# Patient Record
Sex: FEMALE | Race: WHITE | NOT HISPANIC OR LATINO | Employment: FULL TIME | URBAN - METROPOLITAN AREA
[De-identification: names, ages, dates, MRNs, and addresses within clinical notes are randomized per-mention and may not be internally consistent; named-entity substitution may affect disease eponyms.]

---

## 2017-12-04 ENCOUNTER — INITIAL PRENATAL (OUTPATIENT)
Dept: OBSTETRICS AND GYNECOLOGY | Age: 37
End: 2017-12-04

## 2017-12-04 ENCOUNTER — PROCEDURE VISIT (OUTPATIENT)
Dept: OBSTETRICS AND GYNECOLOGY | Age: 37
End: 2017-12-04

## 2017-12-04 VITALS
BODY MASS INDEX: 20.83 KG/M2 | SYSTOLIC BLOOD PRESSURE: 112 MMHG | HEIGHT: 65 IN | WEIGHT: 125 LBS | DIASTOLIC BLOOD PRESSURE: 60 MMHG

## 2017-12-04 DIAGNOSIS — O09.521 ELDERLY MULTIGRAVIDA IN FIRST TRIMESTER: ICD-10-CM

## 2017-12-04 DIAGNOSIS — Z3A.10 10 WEEKS GESTATION OF PREGNANCY: Primary | ICD-10-CM

## 2017-12-04 DIAGNOSIS — Z3A.10 10 WEEKS GESTATION OF PREGNANCY: ICD-10-CM

## 2017-12-04 DIAGNOSIS — O09.521 ELDERLY MULTIGRAVIDA IN FIRST TRIMESTER: Primary | ICD-10-CM

## 2017-12-04 DIAGNOSIS — Z12.4 ROUTINE CERVICAL SMEAR: ICD-10-CM

## 2017-12-04 DIAGNOSIS — Z11.51 SCREENING FOR HUMAN PAPILLOMAVIRUS: ICD-10-CM

## 2017-12-04 DIAGNOSIS — Z11.3 SCREEN FOR STD (SEXUALLY TRANSMITTED DISEASE): ICD-10-CM

## 2017-12-04 PROBLEM — O09.529 AMA (ADVANCED MATERNAL AGE) MULTIGRAVIDA 35+: Status: ACTIVE | Noted: 2017-12-04

## 2017-12-04 LAB — VZV IGG SER QL: NORMAL

## 2017-12-04 PROCEDURE — 0501F PRENATAL FLOW SHEET: CPT | Performed by: OBSTETRICS & GYNECOLOGY

## 2017-12-04 PROCEDURE — 76817 TRANSVAGINAL US OBSTETRIC: CPT | Performed by: OBSTETRICS & GYNECOLOGY

## 2017-12-04 RX ORDER — LEVOTHYROXINE SODIUM 88 UG/1
88 TABLET ORAL DAILY
COMMUNITY
End: 2018-02-05 | Stop reason: DRUGHIGH

## 2017-12-04 RX ORDER — DOCONEXENT, NIACINAMIDE, .ALPHA.-TOCOPHEROL ACETATE, DL-, CHOLECALCIFEROL, .BETA.-CAROTENE, ASCORBIC ACID, THIAMINE MONONITRATE, RIBOFLAVIN, PYRIDOXINE HYDROCHLORIDE, CYANOCOBALAMIN, IRON, ZINC OXIDE, CUPRIC OXIDE, POTASSIUM IODIDE, MAGNESIUM OXIDE, FOLIC ACID, AND LEVOMEFOLATE CALCIUM 200; 15; 20; 1000; 1100; 30; 1.6; 1.8; 2.5; 12; 29; 25; 2; 150; 20; .4; .6 MG/1; MG/1; [IU]/1; [IU]/1; [IU]/1; MG/1; MG/1; MG/1; MG/1; UG/1; MG/1; MG/1; MG/1; UG/1; MG/1; MG/1; MG/1
200 CAPSULE, LIQUID FILLED ORAL DAILY
Qty: 30 CAPSULE | Refills: 11 | Status: SHIPPED | OUTPATIENT
Start: 2017-12-04 | End: 2017-12-04

## 2017-12-04 RX ORDER — FLUTICASONE PROPIONATE 50 MCG
2 SPRAY, SUSPENSION (ML) NASAL DAILY
COMMUNITY

## 2017-12-04 RX ORDER — MONTELUKAST SODIUM 10 MG/1
10 TABLET ORAL NIGHTLY
COMMUNITY

## 2017-12-04 NOTE — PROGRESS NOTES
"Janusz Green is being seen today for her first obstetrical visit.  The patient is a new patient to me.  She previously lived in [place] and delivered her first child a year.  Her first pregnancy was a term vaginal delivery was complicated by pups rash at term.  Patient had membrane stripping and delivered the next day.  She does have a history of hypothyroidism.  This is a planned pregnancy. She is at 10w6d gestation. Her obstetrical history is significant for advanced maternal age. Relationship with FOB: spouse, living together. Patient does intend to breast feed. Pregnancy history fully reviewed.    Menstrual History:  OB History      Para Term  AB Living    2 1 1   1    SAB TAB Ectopic Multiple Live Births        1        Obstetric Comments    PUPPS - membranes stripped - labor the next day          Menarche age:   Patient's last menstrual period was 2017.       The following portions of the patient's history were reviewed and updated as appropriate: allergies, current medications, past family history, past medical history, past social history, past surgical history and problem list.    Review of Systems  Pertinent items are noted in HPI.      Objective     /60  Ht 65\" (165.1 cm)  Wt 125 lb (56.7 kg)  LMP 2017  BMI 20.8 kg/m2  General appearance: alert, appears stated age and cooperative  Neck: no adenopathy, no carotid bruit, supple, symmetrical, trachea midline and thyroid not enlarged, symmetric, no tenderness/mass/nodules  Lungs: clear to auscultation bilaterally  Breasts: normal appearance, no masses or tenderness  Heart: regular rate and rhythm  Abdomen: soft, non-tender; bowel sounds normal; no masses,  no organomegaly   Pelvic : The uterus is anteverted and approximately 10 weeks size.  No pelvic masses are palpated.  Cervix appears normal in appearance Paps or was collected.  Extremities: extremities normal, atraumatic, no cyanosis or edema  Skin: Skin " color, texture, turgor normal. No rashes or lesions      Assessment     Pregnancy at 10 and 3/7 weeks      Plan     Initial labs drawn.  TSH  and first trimester testing also drawn.  Prenatal vitamins.  New OB information was reviewed in detail.  Patient desires first trimester testing.  She believes she has had carrier testing in her previous pregnancy in [place].  She will try to bring any records she has in.   Problem list reviewed and updated.  AFP3 discussed: declined.  Role of ultrasound in pregnancy discussed; fetal survey: requested.  Amniocentesis discussed: declined.  Follow up in 4 weeks.  .

## 2017-12-06 LAB
BACTERIA UR CULT: NO GROWTH
BACTERIA UR CULT: NORMAL

## 2017-12-08 LAB
C TRACH RRNA CVX QL NAA+PROBE: NEGATIVE
CYTOLOGIST CVX/VAG CYTO: NORMAL
CYTOLOGY CVX/VAG DOC THIN PREP: NORMAL
DX ICD CODE: NORMAL
HIV 1 & 2 AB SER-IMP: NORMAL
HPV I/H RISK 4 DNA CVX QL PROBE+SIG AMP: NEGATIVE
N GONORRHOEA RRNA CVX QL NAA+PROBE: NEGATIVE
OTHER STN SPEC: NORMAL
PATH REPORT.FINAL DX SPEC: NORMAL
STAT OF ADQ CVX/VAG CYTO-IMP: NORMAL

## 2017-12-10 LAB
# FETUSES US: 1
ABO GROUP BLD: (no result)
BASOPHILS # BLD AUTO: 0 X10E3/UL (ref 0–0.2)
BASOPHILS NFR BLD AUTO: 0 %
BLD GP AB SCN SERPL QL: NEGATIVE
CFDNA.FET/CFDNA.TOTAL SFR FETUS: 16.1 %
CHR X + Y ANEUP PLAS.CFDNA: NORMAL
CITATION REF LAB TEST: NORMAL
CYTOGENETICS STUDY: NORMAL
EOSINOPHIL # BLD AUTO: 0 X10E3/UL (ref 0–0.4)
EOSINOPHIL NFR BLD AUTO: 1 %
ERYTHROCYTE [DISTWIDTH] IN BLOOD BY AUTOMATED COUNT: 13.8 % (ref 12.3–15.4)
FET 13+18+21+X+Y ANEUP PLAS.CFDNA: NORMAL
FET CHR 13 TS PLAS.CFDNA QL: NORMAL
FET CHR 13 TS PLAS.CFDNA QL: NORMAL
FET CHR 18 TS PLAS.CFDNA QL: NORMAL
FET CHR 18 TS PLAS.CFDNA QL: NORMAL
FET CHR 21 TS PLAS.CFDNA QL: NORMAL
FET CHR 21 TS PLAS.CFDNA QL: NORMAL
FET CHROM X + Y ANEUP CFDNA IMP: NORMAL
GA: 10.9 WEEKS
GENETIC ALGORITHM SENSITIVITY: NORMAL %
HBV SURFACE AG SERPL QL IA: NEGATIVE
HCT VFR BLD AUTO: 38.2 % (ref 34–46.6)
HGB BLD-MCNC: 13 G/DL (ref 11.1–15.9)
HIV 1+2 AB+HIV1 P24 AG SERPL QL IA: NON REACTIVE
IMM GRANULOCYTES # BLD: 0 X10E3/UL (ref 0–0.1)
IMM GRANULOCYTES NFR BLD: 0 %
LAB DIRECTOR NAME PROVIDER: NORMAL
LYMPHOCYTES # BLD AUTO: 2.7 X10E3/UL (ref 0.7–3.1)
LYMPHOCYTES NFR BLD AUTO: 33 %
Lab: NORMAL
MCH RBC QN AUTO: 30.2 PG (ref 26.6–33)
MCHC RBC AUTO-ENTMCNC: 34 G/DL (ref 31.5–35.7)
MCV RBC AUTO: 89 FL (ref 79–97)
MONOCYTES # BLD AUTO: 0.5 X10E3/UL (ref 0.1–0.9)
MONOCYTES NFR BLD AUTO: 6 %
NEUTROPHILS # BLD AUTO: 4.9 X10E3/UL (ref 1.4–7)
NEUTROPHILS NFR BLD AUTO: 60 %
PLATELET # BLD AUTO: 332 X10E3/UL (ref 150–379)
RBC # BLD AUTO: 4.3 X10E6/UL (ref 3.77–5.28)
REASON FOR REFERRAL (NARRATIVE): NORMAL
REF LAB TEST METHOD: NORMAL
RH BLD: POSITIVE
RPR SER QL: NON REACTIVE
RUBV IGG SERPL IA-ACNC: 2.66 INDEX
SERVICE CMNT 02-IMP: NORMAL
SERVICE CMNT-IMP: NORMAL
TSH SERPL DL<=0.005 MIU/L-ACNC: 2.91 UIU/ML (ref 0.45–4.5)
WBC # BLD AUTO: 8.2 X10E3/UL (ref 3.4–10.8)

## 2017-12-11 ENCOUNTER — TELEPHONE (OUTPATIENT)
Dept: OBSTETRICS AND GYNECOLOGY | Age: 37
End: 2017-12-11

## 2017-12-11 NOTE — TELEPHONE ENCOUNTER
----- Message from Celio Rizo MD sent at 12/10/2017  4:53 PM EST -----  Notify normal results - including first trimester testing and blood work. Notify of gender if patient would like to know.

## 2017-12-11 NOTE — TELEPHONE ENCOUNTER
Lm, pap and labs normal. First trimester testing normal, if pt wants to know gender it is a female.

## 2018-01-02 ENCOUNTER — ROUTINE PRENATAL (OUTPATIENT)
Dept: OBSTETRICS AND GYNECOLOGY | Age: 38
End: 2018-01-02

## 2018-01-02 VITALS — SYSTOLIC BLOOD PRESSURE: 118 MMHG | DIASTOLIC BLOOD PRESSURE: 74 MMHG | BODY MASS INDEX: 21.3 KG/M2 | WEIGHT: 128 LBS

## 2018-01-02 DIAGNOSIS — E06.3 HASHIMOTO'S THYROIDITIS: Primary | ICD-10-CM

## 2018-01-02 PROCEDURE — 0502F SUBSEQUENT PRENATAL CARE: CPT | Performed by: OBSTETRICS & GYNECOLOGY

## 2018-01-02 NOTE — PROGRESS NOTES
Chief Complaint   Patient presents with   • Routine Prenatal Visit     HPI- Pt is 37 y.o.  at 15w0d here for prenatal visit.  TSH was 2.9 approximately 1 month ago.  Patient is taking 88 µg of Synthroid.  She recently traveled to [place].  She is feeling well overall.  First trimester testing was normal.  Gender is female.  ROS-     - No vaginal bleeding    GI- No abdominal pain  /74  Wt 58.1 kg (128 lb)  LMP 2017  BMI 21.3 kg/m2  Exam - See flow sheet    Assessment-  Diagnoses and all orders for this visit:    Hashimoto's thyroiditis  -     TSH      Recheck TSH today.  Return for anatomy ultrasound at 20 weeks.

## 2018-01-03 ENCOUNTER — TELEPHONE (OUTPATIENT)
Dept: OBSTETRICS AND GYNECOLOGY | Age: 38
End: 2018-01-03

## 2018-01-03 LAB — TSH SERPL DL<=0.005 MIU/L-ACNC: 4.25 MIU/ML (ref 0.27–4.2)

## 2018-01-03 NOTE — TELEPHONE ENCOUNTER
Pt not'd of labs. Called Synthroid 100 MCG 30 w/ 1 refill taking once daily to pharm on file.Spoke w/ Steven.

## 2018-01-03 NOTE — TELEPHONE ENCOUNTER
----- Message from Celio Rizo MD sent at 1/3/2018  8:49 AM EST -----  Notify TSH is elevated at 4.2.  Patient will need to increase her dose to 100 µg daily and we will recheck in 6 weeks.  Please call the dose into pharmacy

## 2018-02-05 ENCOUNTER — PROCEDURE VISIT (OUTPATIENT)
Dept: OBSTETRICS AND GYNECOLOGY | Age: 38
End: 2018-02-05

## 2018-02-05 ENCOUNTER — ROUTINE PRENATAL (OUTPATIENT)
Dept: OBSTETRICS AND GYNECOLOGY | Age: 38
End: 2018-02-05

## 2018-02-05 VITALS — BODY MASS INDEX: 22.13 KG/M2 | SYSTOLIC BLOOD PRESSURE: 108 MMHG | WEIGHT: 133 LBS | DIASTOLIC BLOOD PRESSURE: 74 MMHG

## 2018-02-05 DIAGNOSIS — Z34.92 SECOND TRIMESTER FETUS: Primary | ICD-10-CM

## 2018-02-05 DIAGNOSIS — O09.522 ELDERLY MULTIGRAVIDA IN SECOND TRIMESTER: ICD-10-CM

## 2018-02-05 DIAGNOSIS — E06.3 HASHIMOTO'S THYROIDITIS: Primary | ICD-10-CM

## 2018-02-05 LAB — TSH SERPL DL<=0.005 MIU/L-ACNC: 2 MIU/ML (ref 0.27–4.2)

## 2018-02-05 PROCEDURE — 0502F SUBSEQUENT PRENATAL CARE: CPT | Performed by: OBSTETRICS & GYNECOLOGY

## 2018-02-05 PROCEDURE — 76805 OB US >/= 14 WKS SNGL FETUS: CPT | Performed by: OBSTETRICS & GYNECOLOGY

## 2018-02-05 RX ORDER — LEVOTHYROXINE SODIUM 0.1 MG/1
TABLET ORAL
Refills: 1 | COMMUNITY
Start: 2018-01-03 | End: 2018-03-08 | Stop reason: SDUPTHER

## 2018-02-05 NOTE — PROGRESS NOTES
Chief Complaint   Patient presents with   • Pregnancy Ultrasound     HPI- Pt is 38 y.o.  at 19w6d here for prenatal visit.  Anatomy u/s today. TSH was elevated at 4 about 1 month ago.  Her Synthroid was increased to 100 µg per day.  Patient had some questions today about OB call.  She was treated by an OB/GYN last pregnancy who saw her for all her visits and*for all the visits at the hospital.  She was not happy that there is a group practice here.  ROS-     - No vaginal bleeding    GI- No abdominal pain  /74  Wt 60.3 kg (133 lb)  LMP 2017  BMI 22.13 kg/m2  Exam - See flow sheet  Anatomy ultrasound shows normal anatomy within the limits of ultrasound.  Size equals dates.  Placenta is anterior with no previa.  Cervical length is normal.  Assessment-  Diagnoses and all orders for this visit:    Hashimoto's thyroiditis  -     TSH    Elderly multigravida in second trimester    Other orders  -     levothyroxine (SYNTHROID, LEVOTHROID) 100 MCG tablet; TK 1 T PO QD      We reviewed the ultrasound.  It looks good today.  Patient was offered an ultrasound with [facility] since she is advanced maternal age at age 38.  Patient declined.  We discussed the call schedule.  I did recommend that she consider induction of labor at 39 weeks due to her age.  She declines induction of labor at this point.  I discussed days that I'm on-call near her due date.  She states she will stay with this office for now.  We also reviewed a supplement that she is taking for reflux.  It contains calcium.  It appears okay.  We also discussed if needed she could move up to Zantac.  She has taken Prilosec in the past.

## 2018-02-06 ENCOUNTER — TELEPHONE (OUTPATIENT)
Dept: OBSTETRICS AND GYNECOLOGY | Age: 38
End: 2018-02-06

## 2018-02-06 NOTE — TELEPHONE ENCOUNTER
----- Message from Celio Rizo MD sent at 2/6/2018  7:07 AM EST -----  notify notify TSH looks much better.  It is now at 2, we will continue with current dose of central

## 2018-03-06 ENCOUNTER — ROUTINE PRENATAL (OUTPATIENT)
Dept: OBSTETRICS AND GYNECOLOGY | Age: 38
End: 2018-03-06

## 2018-03-06 VITALS — BODY MASS INDEX: 22.8 KG/M2 | SYSTOLIC BLOOD PRESSURE: 108 MMHG | DIASTOLIC BLOOD PRESSURE: 72 MMHG | WEIGHT: 137 LBS

## 2018-03-06 DIAGNOSIS — O09.522 ELDERLY MULTIGRAVIDA IN SECOND TRIMESTER: Primary | ICD-10-CM

## 2018-03-06 PROBLEM — K21.9 GERD (GASTROESOPHAGEAL REFLUX DISEASE): Status: ACTIVE | Noted: 2018-03-06

## 2018-03-06 PROCEDURE — 0502F SUBSEQUENT PRENATAL CARE: CPT | Performed by: OBSTETRICS & GYNECOLOGY

## 2018-03-06 RX ORDER — LANSOPRAZOLE 15 MG/1
15 CAPSULE, DELAYED RELEASE ORAL DAILY
COMMUNITY
End: 2018-07-30

## 2018-03-06 NOTE — PROGRESS NOTES
Patient complains of increased problems with reflux.  She has started on Prevacid 15 mg a day and she has seen an improvement.  She is also had a small amount of nasal bleeding.  She is using Flonase and Singulair.  Her recent TSH was improved.    Exam-see flowsheet    Assessment-24 weeks, Advanced  maternal age.  We did discuss recommendation for delivery between 39 and 40 weeks.  Plan-return in 4 weeks for 28 week labs.  Patient will have her  get the pertussis vaccination.

## 2018-03-08 RX ORDER — LEVOTHYROXINE SODIUM 0.1 MG/1
TABLET ORAL
Qty: 30 TABLET | Refills: 0 | Status: SHIPPED | OUTPATIENT
Start: 2018-03-08 | End: 2018-04-02 | Stop reason: SDUPTHER

## 2018-04-02 ENCOUNTER — ROUTINE PRENATAL (OUTPATIENT)
Dept: OBSTETRICS AND GYNECOLOGY | Age: 38
End: 2018-04-02

## 2018-04-02 VITALS — DIASTOLIC BLOOD PRESSURE: 70 MMHG | BODY MASS INDEX: 23.3 KG/M2 | SYSTOLIC BLOOD PRESSURE: 116 MMHG | WEIGHT: 140 LBS

## 2018-04-02 DIAGNOSIS — Z13.1 SCREENING FOR DIABETES MELLITUS: ICD-10-CM

## 2018-04-02 DIAGNOSIS — Z3A.27 27 WEEKS GESTATION OF PREGNANCY: ICD-10-CM

## 2018-04-02 DIAGNOSIS — E06.3 HASHIMOTO'S DISEASE: Primary | ICD-10-CM

## 2018-04-02 LAB
BASOPHILS # BLD AUTO: 0.01 10*3/MM3 (ref 0–0.2)
BASOPHILS NFR BLD AUTO: 0.1 % (ref 0–1.5)
EOSINOPHIL # BLD AUTO: 0.04 10*3/MM3 (ref 0–0.7)
EOSINOPHIL NFR BLD AUTO: 0.5 % (ref 0.3–6.2)
ERYTHROCYTE [DISTWIDTH] IN BLOOD BY AUTOMATED COUNT: 13.5 % (ref 11.7–13)
GLUCOSE 1H P 50 G GLC PO SERPL-MCNC: 89 MG/DL (ref 65–139)
HCT VFR BLD AUTO: 35.4 % (ref 35.6–45.5)
HGB BLD-MCNC: 11.6 G/DL (ref 11.9–15.5)
IMM GRANULOCYTES # BLD: 0.02 10*3/MM3 (ref 0–0.03)
IMM GRANULOCYTES NFR BLD: 0.3 % (ref 0–0.5)
LYMPHOCYTES # BLD AUTO: 1.76 10*3/MM3 (ref 0.9–4.8)
LYMPHOCYTES NFR BLD AUTO: 23.4 % (ref 19.6–45.3)
MCH RBC QN AUTO: 31.2 PG (ref 26.9–32)
MCHC RBC AUTO-ENTMCNC: 32.8 G/DL (ref 32.4–36.3)
MCV RBC AUTO: 95.2 FL (ref 80.5–98.2)
MONOCYTES # BLD AUTO: 0.67 10*3/MM3 (ref 0.2–1.2)
MONOCYTES NFR BLD AUTO: 8.9 % (ref 5–12)
NEUTROPHILS # BLD AUTO: 5.03 10*3/MM3 (ref 1.9–8.1)
NEUTROPHILS NFR BLD AUTO: 66.8 % (ref 42.7–76)
PLATELET # BLD AUTO: 222 10*3/MM3 (ref 140–500)
RBC # BLD AUTO: 3.72 10*6/MM3 (ref 3.9–5.2)
TSH SERPL DL<=0.005 MIU/L-ACNC: 1.15 MIU/ML (ref 0.27–4.2)
WBC # BLD AUTO: 7.53 10*3/MM3 (ref 4.5–10.7)

## 2018-04-02 PROCEDURE — 0502F SUBSEQUENT PRENATAL CARE: CPT | Performed by: PHYSICIAN ASSISTANT

## 2018-04-02 RX ORDER — LEVOTHYROXINE SODIUM 0.1 MG/1
TABLET ORAL
Qty: 30 TABLET | Refills: 0 | Status: SHIPPED | OUTPATIENT
Start: 2018-04-02 | End: 2018-05-07 | Stop reason: SDUPTHER

## 2018-04-02 NOTE — PROGRESS NOTES
Pt is 27 wk 6 day pregnant pt that is here for a routine check up.  She is doing well. She has no c/o. Dealing with heartburn by taking her prevacid and also using a shot of apple cider vinegar.  She also uses an occasional tums. Good FM.  1 hour glucose test today and tsh will be added as well.  A/P-27 wk 6 day pregnant pt that is here for routine ob visit and 1 hour gtt.  Doing well.  Will f/u for routine ob visit in 4 wks.

## 2018-04-19 ENCOUNTER — ROUTINE PRENATAL (OUTPATIENT)
Dept: OBSTETRICS AND GYNECOLOGY | Age: 38
End: 2018-04-19

## 2018-04-19 ENCOUNTER — PROCEDURE VISIT (OUTPATIENT)
Dept: OBSTETRICS AND GYNECOLOGY | Age: 38
End: 2018-04-19

## 2018-04-19 VITALS — WEIGHT: 142 LBS | SYSTOLIC BLOOD PRESSURE: 108 MMHG | BODY MASS INDEX: 23.63 KG/M2 | DIASTOLIC BLOOD PRESSURE: 74 MMHG

## 2018-04-19 DIAGNOSIS — Z03.74 ENCOUNTER FOR SUSPECTED PROBLEM WITH FETAL GROWTH RULED OUT: Primary | ICD-10-CM

## 2018-04-19 DIAGNOSIS — K21.9 GASTROESOPHAGEAL REFLUX DISEASE WITHOUT ESOPHAGITIS: ICD-10-CM

## 2018-04-19 DIAGNOSIS — E06.3 HASHIMOTO'S THYROIDITIS: ICD-10-CM

## 2018-04-19 DIAGNOSIS — M53.3 SI (SACROILIAC) JOINT DYSFUNCTION: ICD-10-CM

## 2018-04-19 DIAGNOSIS — O09.523 ELDERLY MULTIGRAVIDA IN THIRD TRIMESTER: Primary | ICD-10-CM

## 2018-04-19 PROCEDURE — 76816 OB US FOLLOW-UP PER FETUS: CPT | Performed by: OBSTETRICS & GYNECOLOGY

## 2018-04-19 PROCEDURE — 0502F SUBSEQUENT PRENATAL CARE: CPT | Performed by: OBSTETRICS & GYNECOLOGY

## 2018-04-19 RX ORDER — PNV NO.95/FERROUS FUM/FOLIC AC 28MG-0.8MG
TABLET ORAL
COMMUNITY
Start: 2018-04-02 | End: 2018-07-30

## 2018-04-19 NOTE — PROGRESS NOTES
Patient complains of SI pain.  She is moving slowly.  She is working with her chiropractor and is trying stretches.    Ultrasound today shows normal fetal weight.  Baby is 3 lbs. 9 oz. at the 40th percentile.  LENNY is 12.  Baby is head down.  Exam is normal today see flowsheet    Assessment-30 weeks with advanced maternal age pregnancy  Patient will be referred to physical therapy for SI joint pain.  Recheck TSH which was recently normal at her next visit.  Start biophysical profiles at 36 weeks.

## 2018-04-27 ENCOUNTER — HOSPITAL ENCOUNTER (OUTPATIENT)
Dept: PHYSICAL THERAPY | Facility: HOSPITAL | Age: 38
Setting detail: THERAPIES SERIES
Discharge: HOME | End: 2018-04-27
Attending: OBSTETRICS & GYNECOLOGY

## 2018-04-27 DIAGNOSIS — G89.29 CHRONIC LEFT SI JOINT PAIN: ICD-10-CM

## 2018-04-27 DIAGNOSIS — G89.29 CHRONIC RIGHT SI JOINT PAIN: Primary | ICD-10-CM

## 2018-04-27 DIAGNOSIS — M53.3 CHRONIC LEFT SI JOINT PAIN: ICD-10-CM

## 2018-04-27 DIAGNOSIS — M53.3 SI (SACROILIAC) JOINT DYSFUNCTION: ICD-10-CM

## 2018-04-27 DIAGNOSIS — M53.3 CHRONIC RIGHT SI JOINT PAIN: Primary | ICD-10-CM

## 2018-04-27 PROCEDURE — 97110 THERAPEUTIC EXERCISES: CPT

## 2018-04-27 PROCEDURE — 97161 PT EVAL LOW COMPLEX 20 MIN: CPT

## 2018-04-27 NOTE — THERAPY EVALUATION
Outpatient Physical Therapy Ortho Initial Evaluation  [facility]     Patient Name: Windy Green  : 1980  MRN: [id number]  Today's Date: 2018      Visit Date: 2018    Patient Active Problem List   Diagnosis   • Hashimoto's thyroiditis   • AMA (advanced maternal age) multigravida 35+   • GERD (gastroesophageal reflux disease)   • SI (sacroiliac) joint dysfunction        Past Medical History:   Diagnosis Date   • Acid reflux    • Hashimoto's thyroiditis    • Seasonal allergies         Past Surgical History:   Procedure Laterality Date   • PILONIDAL CYSTECTOMY     • WISDOM TOOTH EXTRACTION         Visit Dx:     ICD-10-CM ICD-9-CM   1. Chronic right SI joint pain M53.3 724.6    G89.29 338.29   2. SI (sacroiliac) joint dysfunction M53.3 724.6   3. Chronic left SI joint pain M53.3 724.6    G89.29 338.29             Patient History     Row Name 18 1300             History    Chief Complaint Pain  -LS      Type of Pain Other pain  -LS      Date Current Problem(s) Began 18  -LS      Brief Description of Current Complaint Pt reports returning home from [employer] vacation and experiencing R SI joint pain. Hx of similar episode in 2016 after first baby birth. Pt is 32 weeks pregnant with second child. She reports relief from chiropractic visit and is now wearing SI joint belt. This week increased R sided pain of unknown cause. Greatest difficulty caring for toddler at home.  -LS      Previous treatment for THIS PROBLEM Chiropractor  -LS      Patient/Caregiver Goals Relieve pain;Return to prior level of function;Know what to do to help the symptoms  -LS      Occupation/sports/leisure activities Pt works from home, travels occasionally.  -LS      Patient seeing anyone else for problem(s)? yes  -LS      How has patient tried to help current problem? chiropractor  -LS      History of Previous Related Injuries similar episode post partum first child   -LS      Are you or can you be pregnant Yes   -LS         Pain     Pain Location Buttocks  -LS      Pain Frequency Intermittent  -LS      Pain Description Sharp;Stabbing;Aching;Shooting  -LS      What Performance Factors Make the Current Problem(s) WORSE? sit to stand, lifting child, bathing/assisting child in bathroom  -LS      What Performance Factors Make the Current Problem(s) BETTER? rest  -LS      Pain Comments It was on the L sometimes too. Now I feel it mostly on the R.  -LS      Tolerance Time- Standing 10 minutes  -LS      Tolerance Time- Sitting some pain with transition/upright position  -LS      Tolerance Time- Walking 15 minutes  -LS      Tolerance Time- Lying difficulty getting comfortable.  -LS      Is your sleep disturbed? Yes  -LS      What position do you sleep in? Right sidelying;Left sidelying  -LS      Difficulties at work? Inc pain in work chair at home, some with travel.  -LS      Difficulties with ADL's? Yes, difficulty bending to  from floor, lifting leg to dress.  -LS      Difficulties with recreational activities? Yes, unable to perform.  -LS         Fall Risk Assessment    Any falls in the past year: No  -LS         Services    Prior Rehab/Home Health Experiences No  -LS         Daily Activities    Primary Language English  -LS      Pt Participated in POC and Goals Yes  -LS         Safety    Are you being hurt, hit, or frightened by anyone at home or in your life? No  -LS      Are you being neglected by a caregiver No  -LS        User Key  (r) = Recorded By, (t) = Taken By, (c) = Cosigned By    Initials Name Provider Type    LS Swathi Álvarez PT Physical Therapist                PT Ortho     Row Name 04/27/18 1600       Subjective Comments    Subjective Comments I am just nervous that having the baby will make it worse again. And this time I have a toddler to woody around and .  -LS       Subjective Pain    Able to rate subjective pain? yes  -LS    Pre-Treatment Pain Level 3  -LS    Post-Treatment Pain Level 3  -LS        Posture/Observations    Posture/Observations Comments anterior pelvic tilt, L anterior innominate rotation, R rotation at sacrum  -LS       Quarter Clearing    Quarter Clearing --   BLE WFL  -LS       Lumbar/SI Special Tests    Standing Flexion Test (SI Dysfunction) Positive  -LS    Seated Flexion Test (SI Dysfunction) Positive  -LS    Slump Test (Neural Tension) Positive  -LS       General ROM    GENERAL ROM COMMENTS BLE WFL  -LS       Sensation    Sensation WNL? WNL  -LS       Lower Extremity Flexibility    Hamstrings Bilateral:;Mildly limited  -LS    Hip External Rotators Bilateral:;Mildly limited  -LS    Hip Internal Rotators Bilateral:;WNL  -LS    Gastrocnemius WNL  -LS       Gait/Stairs Assessment/Training    [place] Level (Gait) independent  -LS    Distance in Feet (Gait) 50  -LS    Deviations/Abnormal Patterns (Gait) antalgic  -LS    Comment (Gait/Stairs) wide IRIS, dec R hip flexion  -LS      User Key  (r) = Recorded By, (t) = Taken By, (c) = Cosigned By    Initials Name Provider Type    LS Swathi Álvarez, PT Physical Therapist                      Therapy Education  Education Details: discussed bed mobility, heat/ice use, core/glute activation for stability, body mechanics for household tasks and childcare  Given: HEP, Symptoms/condition management, Mobility training, Posture/body mechanics  Program: New  How Provided: Verbal, Demonstration, Written  Provided to: Patient  Level of Understanding: Teach back education performed, Verbalized, Demonstrated           PT OP Goals     Row Name 04/27/18 1600          PT Short Term Goals    STG Date to Achieve 05/11/18  -LS     STG 1 Pt will demonstrate understanding and compliance with initial HEP.  -LS     STG 1 Progress New  -LS     STG 2 Pt will report reduced symptoms by 50% using appropriate body mechanics and stabilization strategies.  -LS     STG 2 Progress New  -LS        Long Term Goals    LTG Date to Achieve 05/27/18  -LS     LTG 1 Pt will  demonstrate understanding of advanced HEP to allow her continued management of symptoms.  -LS     LTG 1 Progress New  -LS     LTG 2 Pt will report 0/10 pain with ambulation.  -LS     LTG 2 Progress New  -LS     LTG 3 Pt will demonstrate STS with appropriate mechanics without SI joint aggravation.  -LS     LTG 3 Progress New  -LS        Time Calculation    PT Goal Re-Cert Due Date 07/26/18  -LS       User Key  (r) = Recorded By, (t) = Taken By, (c) = Cosigned By    Initials Name Provider Type    LS Swathi Álvarez, PT Physical Therapist                PT Assessment/Plan     Row Name 04/27/18 1646          PT Assessment    Functional Limitations Limitation in home management;Performance in leisure activities;Performance in work activities;Performance in self-care ADL;Limitations in community activities;Impaired gait;Impaired locomotion;Limitations in functional capacity and performance;Performance in sport activities  -LS     Impairments Pain;Range of motion;Joint mobility;Posture;Joint integrity;Gait;Impaired flexibility;Locomotion;Poor body mechanics  -LS     Assessment Comments Pt is 39 yo female 32 weeks pregnant reporting 1 month onset of evolving R SI joint pain with occasional L sided pain and shooting across low back. SHe has hx of similar symptoms in 2016 after having first child. She demonstrates normal ROM of lumbar spine but pain with all planes. BLE ROM and strength WFL and sensation and reflexes are intact and equal B. She demonstrates anterior L ASIS and R rotated sacrum with R PSIS posterior compared to L. She has pain with all transitional movements. Unable to correct alignment with MET today but reduced pain with TA activation and positional distraction. Pt is limited in her ability to care for child, manage household tasks, and dress herself. Pt will benefit from continued skilled PT services in order to correct pelvic alignment and stabilize SI joint.   -LS     Please refer to paper survey for  additional self-reported information Yes  -LS     Rehab Potential Good  -LS     Patient/caregiver participated in establishment of treatment plan and goals Yes  -LS     Patient would benefit from skilled therapy intervention Yes  -LS        PT Plan    PT Frequency 1x/week  -LS     Predicted Duration of Therapy Intervention (OT Eval) 4 weeks   -LS     Planned CPT's? PT EVAL LOW COMPLEXITY: 21814;PT THER PROC EA 15 MIN: 36237;PT MANUAL THERAPY EA 15 MIN: 10238;PT GAIT TRAINING EA 15 MIN: 07605;PT HOT OR COLD PACK TREAT MCARE;PT RE-EVAL: 75259;PT THER ACT EA 15 MIN: 18151;PT NEUROMUSC RE-EDUCATION EA 15 MIN: 51583  -LS     PT Plan Comments check pelvic alignment, review body mechanics, focus on stabilization, management strategies.   -LS       User Key  (r) = Recorded By, (t) = Taken By, (c) = Cosigned By    Initials Name Provider Type    KENYETTA Álvarez PT Physical Therapist                  Exercises     Row Name 04/27/18 1600             Subjective Comments    Subjective Comments I am just nervous that having the baby will make it worse again. And this time I have a toddler to woody around and .  -LS         Subjective Pain    Able to rate subjective pain? yes  -LS      Pre-Treatment Pain Level 3  -LS      Post-Treatment Pain Level 3  -LS         Exercise 1    Exercise Name 1 seated piriformis stretch  -LS      Reps 1 3  -LS      Time 1 20  -LS         Exercise 2    Exercise Name 2 seated HS stretch  -LS      Reps 2 3  -LS      Time 2 20  -LS         Exercise 3    Exercise Name 3 supine hip adduction  -LS      Reps 3 10  -LS      Time 3 5  -LS         Exercise 4    Exercise Name 4 supine PPT  -LS      Reps 4 10  -LS      Time 4 5  -LS        User Key  (r) = Recorded By, (t) = Taken By, (c) = Cosigned By    Initials Name Provider Type    KENYETTA Álvarez PT Physical Therapist                        Outcome Measure Options: Lower Extremity Functional Scale (LEFS)  Lower Extremity Functional Index  Any of  your usual work, housework or school activities: No difficulty (21% disability)  Your usual hobbies, recreational or sporting activities: No difficulty  Getting into or out of the bath: A little bit of difficulty  Walking between rooms: A little bit of difficulty  Putting on your shoes or socks: A little bit of difficulty  Squatting: A little bit of difficulty  Lifting an object, like a bag of groceries from the floor: No difficulty  Performing light activities around your home: A little bit of difficulty  Performing heavy activities around your home: A little bit of difficulty  Getting into or out of a car: A little bit of difficulty  Walking 2 blocks: A little bit of difficulty  Walking a mile: A little bit of difficulty  Going up or down 10 stairs (about 1 flight of stairs): A little bit of difficulty  Standing for 1 hour: A little bit of difficulty  Sitting for 1 hour: A little bit of difficulty  Running on even ground: A little bit of difficulty  Running on uneven ground: Moderate difficulty  Making sharp turns while running fast: No difficulty  Hopping: A little bit of difficulty  Rolling over in bed: A little bit of difficulty  Total: 63      Time Calculation:   Start Time: 1130  Stop Time: 1215  Time Calculation (min): 45 min  Total Timed Code Minutes- PT: 15 minute(s)     Therapy Charges for Today     Code Description Service Date Service Provider Modifiers Qty    52029500912 HC PT EVAL LOW COMPLEXITY 2 4/27/2018 Swathi Álvarez, PT GP 1    57707597016 HC PT THER PROC EA 15 MIN 4/27/2018 Swathi Álvarez, PT GP 1          PT G-Codes  Outcome Measure Options: Lower Extremity Functional Scale (LEFS)         Swathi Álvarez PT  4/27/2018

## 2018-05-08 NOTE — TELEPHONE ENCOUNTER
I reviewed her last TSH level which was around 1, I called it normal at the time, but then when I got the request to do a refill, I  Noted/recalled that you like them to be around 2?  Should I/we change her dose of med prior to refilling it?

## 2018-05-09 RX ORDER — LEVOTHYROXINE SODIUM 0.1 MG/1
TABLET ORAL
Qty: 30 TABLET | Refills: 0 | Status: SHIPPED | OUTPATIENT
Start: 2018-05-09 | End: 2018-07-31 | Stop reason: DRUGHIGH

## 2018-05-10 ENCOUNTER — ROUTINE PRENATAL (OUTPATIENT)
Dept: OBSTETRICS AND GYNECOLOGY | Age: 38
End: 2018-05-10

## 2018-05-10 VITALS — BODY MASS INDEX: 23.96 KG/M2 | SYSTOLIC BLOOD PRESSURE: 118 MMHG | DIASTOLIC BLOOD PRESSURE: 80 MMHG | WEIGHT: 144 LBS

## 2018-05-10 DIAGNOSIS — O09.523 ELDERLY MULTIGRAVIDA IN THIRD TRIMESTER: Primary | ICD-10-CM

## 2018-05-10 DIAGNOSIS — M53.3 SI (SACROILIAC) JOINT DYSFUNCTION: ICD-10-CM

## 2018-05-10 DIAGNOSIS — E06.3 HASHIMOTO'S THYROIDITIS: ICD-10-CM

## 2018-05-10 PROCEDURE — 90715 TDAP VACCINE 7 YRS/> IM: CPT | Performed by: OBSTETRICS & GYNECOLOGY

## 2018-05-10 PROCEDURE — 0502F SUBSEQUENT PRENATAL CARE: CPT | Performed by: OBSTETRICS & GYNECOLOGY

## 2018-05-10 PROCEDURE — 90471 IMMUNIZATION ADMIN: CPT | Performed by: OBSTETRICS & GYNECOLOGY

## 2018-05-10 NOTE — PROGRESS NOTES
Patient did go to physical therapy and is still seeing her chiropractor.  The SI pain is a little bit better.  Baby is moving well.  Exam-see flowsheet.  Normal exam today.  Glucose testing was normal.  T dap will be given today.    Assessment -33 weeks with advanced maternal age pregnancy.  TSH today.  Plan for group B strep testing at the patient's next visit and start biophysical profiles at 36 weeks.

## 2018-05-11 LAB — TSH SERPL DL<=0.005 MIU/L-ACNC: 0.69 MIU/ML (ref 0.27–4.2)

## 2018-05-24 ENCOUNTER — ROUTINE PRENATAL (OUTPATIENT)
Dept: OBSTETRICS AND GYNECOLOGY | Age: 38
End: 2018-05-24

## 2018-05-24 VITALS — WEIGHT: 143 LBS | BODY MASS INDEX: 23.8 KG/M2 | SYSTOLIC BLOOD PRESSURE: 112 MMHG | DIASTOLIC BLOOD PRESSURE: 66 MMHG

## 2018-05-24 DIAGNOSIS — Z36.85 ANTENATAL SCREENING FOR STREPTOCOCCUS B: Primary | ICD-10-CM

## 2018-05-24 DIAGNOSIS — E06.3 HASHIMOTO'S THYROIDITIS: ICD-10-CM

## 2018-05-24 DIAGNOSIS — O09.523 ELDERLY MULTIGRAVIDA IN THIRD TRIMESTER: ICD-10-CM

## 2018-05-24 PROCEDURE — 0502F SUBSEQUENT PRENATAL CARE: CPT | Performed by: OBSTETRICS & GYNECOLOGY

## 2018-05-24 NOTE — PROGRESS NOTES
The patient went to a tour of the hospital.  She liked the hospital very much.  Her TSH did come back on the lower end of the normal range at 0.6.  The patient is alternating the 100 µg dose and the 80 µg dose.  We will need to recheck her TSH in about 2 weeks.  Good fetal movement is noted.  No contractions.    Group B strep swab was done today.  Cervix is closed and thick.  Assessment-35 weeks advanced maternal age pregnancy.  Start weekly BPP's next week.  Repeat TSH in about 2 weeks.

## 2018-05-26 LAB — GP B STREP DNA SPEC QL NAA+PROBE: NEGATIVE

## 2018-05-29 ENCOUNTER — TELEPHONE (OUTPATIENT)
Dept: OBSTETRICS AND GYNECOLOGY | Age: 38
End: 2018-05-29

## 2018-05-29 NOTE — TELEPHONE ENCOUNTER
----- Message from Celio Rizo MD sent at 5/28/2018  4:56 PM EDT -----  Please notify GBS is negative.

## 2018-05-31 ENCOUNTER — ROUTINE PRENATAL (OUTPATIENT)
Dept: OBSTETRICS AND GYNECOLOGY | Age: 38
End: 2018-05-31

## 2018-05-31 ENCOUNTER — PROCEDURE VISIT (OUTPATIENT)
Dept: OBSTETRICS AND GYNECOLOGY | Age: 38
End: 2018-05-31

## 2018-05-31 VITALS — BODY MASS INDEX: 23.96 KG/M2 | DIASTOLIC BLOOD PRESSURE: 68 MMHG | WEIGHT: 144 LBS | SYSTOLIC BLOOD PRESSURE: 108 MMHG

## 2018-05-31 DIAGNOSIS — E06.3 HASHIMOTO'S THYROIDITIS: Primary | ICD-10-CM

## 2018-05-31 DIAGNOSIS — O09.523 ELDERLY MULTIGRAVIDA IN THIRD TRIMESTER: Primary | ICD-10-CM

## 2018-05-31 DIAGNOSIS — O09.523 ELDERLY MULTIGRAVIDA IN THIRD TRIMESTER: ICD-10-CM

## 2018-05-31 LAB — TSH SERPL DL<=0.005 MIU/L-ACNC: 0.82 MIU/ML (ref 0.27–4.2)

## 2018-05-31 PROCEDURE — 0502F SUBSEQUENT PRENATAL CARE: CPT | Performed by: OBSTETRICS & GYNECOLOGY

## 2018-05-31 PROCEDURE — 76819 FETAL BIOPHYS PROFIL W/O NST: CPT | Performed by: OBSTETRICS & GYNECOLOGY

## 2018-05-31 PROCEDURE — 76816 OB US FOLLOW-UP PER FETUS: CPT | Performed by: OBSTETRICS & GYNECOLOGY

## 2018-05-31 NOTE — PROGRESS NOTES
Patient is here today for biophysical profile due to advanced maternal age.  We also rechecking her TSH today.  She is alternating her 100 µg and 80 µg dose.  Patient notes good fetal movement and no contractions.    Cervix is closed today.  Group B strep is negative  Ultrasound shows an estimated fetal weight of 6 lbs. 7 oz. at the 53rd percentile.  LENNY is 13.  Biophysical profile is 8 out of 8.    Assessment-36 weeks with advanced maternal age pregnancy.  Ultrasound is reassuring.  Continue biophysical profiles weekly.  We may consider induction of labor on 2/24..

## 2018-06-08 ENCOUNTER — PROCEDURE VISIT (OUTPATIENT)
Dept: OBSTETRICS AND GYNECOLOGY | Age: 38
End: 2018-06-08

## 2018-06-08 ENCOUNTER — ROUTINE PRENATAL (OUTPATIENT)
Dept: OBSTETRICS AND GYNECOLOGY | Age: 38
End: 2018-06-08

## 2018-06-08 VITALS — SYSTOLIC BLOOD PRESSURE: 110 MMHG | DIASTOLIC BLOOD PRESSURE: 75 MMHG | BODY MASS INDEX: 24.3 KG/M2 | WEIGHT: 146 LBS

## 2018-06-08 DIAGNOSIS — E06.3 HASHIMOTO'S THYROIDITIS: Primary | ICD-10-CM

## 2018-06-08 DIAGNOSIS — O09.523 ELDERLY MULTIGRAVIDA IN THIRD TRIMESTER: Primary | ICD-10-CM

## 2018-06-08 DIAGNOSIS — O09.523 ELDERLY MULTIGRAVIDA IN THIRD TRIMESTER: ICD-10-CM

## 2018-06-08 PROCEDURE — 76819 FETAL BIOPHYS PROFIL W/O NST: CPT | Performed by: OBSTETRICS & GYNECOLOGY

## 2018-06-08 PROCEDURE — 0502F SUBSEQUENT PRENATAL CARE: CPT | Performed by: OBSTETRICS & GYNECOLOGY

## 2018-06-08 NOTE — PROGRESS NOTES
TSH was suppressed at 0.8.  She is taking 80 µg a day now status alternating..  She notes good fetal movement.  No regular contractions yet.  With her previous pregnancy she had her membranes stripped twice and went into labor after the second time.  Her  is working on a paper and likely not be done until the 22nd.  We have discussed possible induction of labor on June 24.  I recommended induction between 39 and 40 weeks due to her age.    I have physical profile is 8 out of 8.  LENNY is 11.  Patient is feeling good fetal movement.  Cervix is 1 - 2cm 70% and -2 station.    Assessment-37 weeks advanced maternal age pregnancy.  Continue weekly BPP's.  We will decide on induction of labor pending neck weeks evaluation.  June 24th Sunday was offered.

## 2018-06-15 ENCOUNTER — ROUTINE PRENATAL (OUTPATIENT)
Dept: OBSTETRICS AND GYNECOLOGY | Age: 38
End: 2018-06-15

## 2018-06-15 ENCOUNTER — PROCEDURE VISIT (OUTPATIENT)
Dept: OBSTETRICS AND GYNECOLOGY | Age: 38
End: 2018-06-15

## 2018-06-15 VITALS — BODY MASS INDEX: 23.96 KG/M2 | DIASTOLIC BLOOD PRESSURE: 74 MMHG | SYSTOLIC BLOOD PRESSURE: 108 MMHG | WEIGHT: 144 LBS

## 2018-06-15 DIAGNOSIS — O09.523 ELDERLY MULTIGRAVIDA IN THIRD TRIMESTER: Primary | ICD-10-CM

## 2018-06-15 PROCEDURE — 76819 FETAL BIOPHYS PROFIL W/O NST: CPT | Performed by: OBSTETRICS & GYNECOLOGY

## 2018-06-15 PROCEDURE — 0502F SUBSEQUENT PRENATAL CARE: CPT | Performed by: OBSTETRICS & GYNECOLOGY

## 2018-06-15 NOTE — PROGRESS NOTES
Patient is feeling well.  The baby feels lower.  She would like to have membrane stripping done today.  She declines induction of labor unless she does not go into labor before the 24th.    Biophysical profile is 8 out of 8.  LENNY is 12.  Baby is vertex.  See flowsheet.  Cervix is 4 cm stretchy posterior 70% and -2.    Assessment- advanced maternal age pregnancy with reassuring biophysical profile.  Patient has had a significant cervical change.  Labor warnings were given.  She will return next week for recheck.  She declines induction of labor unless she does not have the baby by the 24th.  Continue kick counts.

## 2018-06-21 ENCOUNTER — PROCEDURE VISIT (OUTPATIENT)
Dept: OBSTETRICS AND GYNECOLOGY | Age: 38
End: 2018-06-21

## 2018-06-21 ENCOUNTER — ANESTHESIA (OUTPATIENT)
Dept: LABOR AND DELIVERY | Facility: HOSPITAL | Age: 38
End: 2018-06-21

## 2018-06-21 ENCOUNTER — ROUTINE PRENATAL (OUTPATIENT)
Dept: OBSTETRICS AND GYNECOLOGY | Age: 38
End: 2018-06-21

## 2018-06-21 ENCOUNTER — ANESTHESIA EVENT (OUTPATIENT)
Dept: LABOR AND DELIVERY | Facility: HOSPITAL | Age: 38
End: 2018-06-21

## 2018-06-21 ENCOUNTER — HOSPITAL ENCOUNTER (INPATIENT)
Facility: HOSPITAL | Age: 38
LOS: 2 days | Discharge: HOME | End: 2018-06-23
Attending: OBSTETRICS & GYNECOLOGY | Admitting: OBSTETRICS & GYNECOLOGY

## 2018-06-21 VITALS — DIASTOLIC BLOOD PRESSURE: 74 MMHG | BODY MASS INDEX: 23.96 KG/M2 | SYSTOLIC BLOOD PRESSURE: 108 MMHG | WEIGHT: 144 LBS

## 2018-06-21 DIAGNOSIS — E06.3 HASHIMOTO'S THYROIDITIS: Primary | ICD-10-CM

## 2018-06-21 DIAGNOSIS — E06.3 HASHIMOTO'S THYROIDITIS: ICD-10-CM

## 2018-06-21 DIAGNOSIS — O09.523 ELDERLY MULTIGRAVIDA IN THIRD TRIMESTER: Primary | ICD-10-CM

## 2018-06-21 DIAGNOSIS — O09.523 ELDERLY MULTIGRAVIDA IN THIRD TRIMESTER: ICD-10-CM

## 2018-06-21 PROBLEM — Z34.90 PREGNANCY: Status: ACTIVE | Noted: 2018-06-21

## 2018-06-21 PROBLEM — Z37.9 NORMAL LABOR: Status: ACTIVE | Noted: 2018-06-21

## 2018-06-21 LAB
ABO GROUP BLD: NORMAL
BLD GP AB SCN SERPL QL: NEGATIVE
DEPRECATED RDW RBC AUTO: 46.6 FL (ref 37–54)
ERYTHROCYTE [DISTWIDTH] IN BLOOD BY AUTOMATED COUNT: 13.9 % (ref 11.7–13)
HCT VFR BLD AUTO: 39.4 % (ref 35.6–45.5)
HGB BLD-MCNC: 13 G/DL (ref 11.9–15.5)
MCH RBC QN AUTO: 30.3 PG (ref 26.9–32)
MCHC RBC AUTO-ENTMCNC: 33 G/DL (ref 32.4–36.3)
MCV RBC AUTO: 91.8 FL (ref 80.5–98.2)
PLATELET # BLD AUTO: 237 10*3/MM3 (ref 140–500)
PMV BLD AUTO: 10.4 FL (ref 6–12)
RBC # BLD AUTO: 4.29 10*6/MM3 (ref 3.9–5.2)
RH BLD: POSITIVE
T&S EXPIRATION DATE: NORMAL
WBC NRBC COR # BLD: 10.75 10*3/MM3 (ref 4.5–10.7)

## 2018-06-21 PROCEDURE — 86850 RBC ANTIBODY SCREEN: CPT | Performed by: OBSTETRICS & GYNECOLOGY

## 2018-06-21 PROCEDURE — C1755 CATHETER, INTRASPINAL: HCPCS | Performed by: ANESTHESIOLOGY

## 2018-06-21 PROCEDURE — 86900 BLOOD TYPING SEROLOGIC ABO: CPT | Performed by: OBSTETRICS & GYNECOLOGY

## 2018-06-21 PROCEDURE — 59400 OBSTETRICAL CARE: CPT | Performed by: OBSTETRICS & GYNECOLOGY

## 2018-06-21 PROCEDURE — 0502F SUBSEQUENT PRENATAL CARE: CPT | Performed by: OBSTETRICS & GYNECOLOGY

## 2018-06-21 PROCEDURE — S0260 H&P FOR SURGERY: HCPCS | Performed by: OBSTETRICS & GYNECOLOGY

## 2018-06-21 PROCEDURE — 85027 COMPLETE CBC AUTOMATED: CPT | Performed by: OBSTETRICS & GYNECOLOGY

## 2018-06-21 PROCEDURE — 10907ZC DRAINAGE OF AMNIOTIC FLUID, THERAPEUTIC FROM PRODUCTS OF CONCEPTION, VIA NATURAL OR ARTIFICIAL OPENING: ICD-10-PCS | Performed by: OBSTETRICS & GYNECOLOGY

## 2018-06-21 PROCEDURE — 86901 BLOOD TYPING SEROLOGIC RH(D): CPT | Performed by: OBSTETRICS & GYNECOLOGY

## 2018-06-21 PROCEDURE — C1755 CATHETER, INTRASPINAL: HCPCS

## 2018-06-21 RX ORDER — OXYTOCIN-SODIUM CHLORIDE 0.9% IV SOLN 30 UNIT/500ML 30-0.9/5 UT/ML-%
2-20 SOLUTION INTRAVENOUS
Status: DISCONTINUED | OUTPATIENT
Start: 2018-06-21 | End: 2018-06-21 | Stop reason: HOSPADM

## 2018-06-21 RX ORDER — SODIUM CHLORIDE 0.9 % (FLUSH) 0.9 %
1-10 SYRINGE (ML) INJECTION AS NEEDED
Status: DISCONTINUED | OUTPATIENT
Start: 2018-06-21 | End: 2018-06-21 | Stop reason: HOSPADM

## 2018-06-21 RX ORDER — DIPHENHYDRAMINE HYDROCHLORIDE 50 MG/ML
12.5 INJECTION INTRAMUSCULAR; INTRAVENOUS EVERY 8 HOURS PRN
Status: DISCONTINUED | OUTPATIENT
Start: 2018-06-21 | End: 2018-06-21 | Stop reason: HOSPADM

## 2018-06-21 RX ORDER — ONDANSETRON 4 MG/1
4 TABLET, FILM COATED ORAL EVERY 6 HOURS PRN
Status: DISCONTINUED | OUTPATIENT
Start: 2018-06-21 | End: 2018-06-21 | Stop reason: HOSPADM

## 2018-06-21 RX ORDER — FAMOTIDINE 20 MG/1
20 TABLET, FILM COATED ORAL 2 TIMES DAILY PRN
Status: DISCONTINUED | OUTPATIENT
Start: 2018-06-21 | End: 2018-06-21 | Stop reason: HOSPADM

## 2018-06-21 RX ORDER — CARBOPROST TROMETHAMINE 250 UG/ML
250 INJECTION, SOLUTION INTRAMUSCULAR AS NEEDED
Status: DISCONTINUED | OUTPATIENT
Start: 2018-06-21 | End: 2018-06-21 | Stop reason: HOSPADM

## 2018-06-21 RX ORDER — ONDANSETRON 4 MG/1
4 TABLET, ORALLY DISINTEGRATING ORAL EVERY 6 HOURS PRN
Status: DISCONTINUED | OUTPATIENT
Start: 2018-06-21 | End: 2018-06-21 | Stop reason: HOSPADM

## 2018-06-21 RX ORDER — BISACODYL 10 MG
10 SUPPOSITORY, RECTAL RECTAL DAILY PRN
Status: DISCONTINUED | OUTPATIENT
Start: 2018-06-22 | End: 2018-06-23 | Stop reason: HOSPADM

## 2018-06-21 RX ORDER — IBUPROFEN 600 MG/1
600 TABLET ORAL EVERY 8 HOURS PRN
Status: DISCONTINUED | OUTPATIENT
Start: 2018-06-21 | End: 2018-06-23 | Stop reason: HOSPADM

## 2018-06-21 RX ORDER — SODIUM CHLORIDE 0.9 % (FLUSH) 0.9 %
1-10 SYRINGE (ML) INJECTION AS NEEDED
Status: DISCONTINUED | OUTPATIENT
Start: 2018-06-21 | End: 2018-06-23 | Stop reason: HOSPADM

## 2018-06-21 RX ORDER — ACETAMINOPHEN 325 MG/1
650 TABLET ORAL EVERY 4 HOURS PRN
Status: DISCONTINUED | OUTPATIENT
Start: 2018-06-21 | End: 2018-06-23 | Stop reason: HOSPADM

## 2018-06-21 RX ORDER — LIDOCAINE HYDROCHLORIDE AND EPINEPHRINE 15; 5 MG/ML; UG/ML
INJECTION, SOLUTION EPIDURAL AS NEEDED
Status: DISCONTINUED | OUTPATIENT
Start: 2018-06-21 | End: 2018-06-21 | Stop reason: SURG

## 2018-06-21 RX ORDER — NALOXONE HCL 0.4 MG/ML
0.4 VIAL (ML) INJECTION
Status: DISCONTINUED | OUTPATIENT
Start: 2018-06-21 | End: 2018-06-23 | Stop reason: HOSPADM

## 2018-06-21 RX ORDER — OXYCODONE HYDROCHLORIDE AND ACETAMINOPHEN 5; 325 MG/1; MG/1
2 TABLET ORAL EVERY 4 HOURS PRN
Status: DISCONTINUED | OUTPATIENT
Start: 2018-06-21 | End: 2018-06-23 | Stop reason: HOSPADM

## 2018-06-21 RX ORDER — ERYTHROMYCIN 5 MG/G
OINTMENT OPHTHALMIC
Status: DISPENSED
Start: 2018-06-21 | End: 2018-06-22

## 2018-06-21 RX ORDER — SODIUM CHLORIDE, SODIUM LACTATE, POTASSIUM CHLORIDE, CALCIUM CHLORIDE 600; 310; 30; 20 MG/100ML; MG/100ML; MG/100ML; MG/100ML
125 INJECTION, SOLUTION INTRAVENOUS CONTINUOUS
Status: DISCONTINUED | OUTPATIENT
Start: 2018-06-21 | End: 2018-06-21

## 2018-06-21 RX ORDER — ZOLPIDEM TARTRATE 5 MG/1
5 TABLET ORAL NIGHTLY PRN
Status: DISCONTINUED | OUTPATIENT
Start: 2018-06-21 | End: 2018-06-23 | Stop reason: HOSPADM

## 2018-06-21 RX ORDER — METHYLERGONOVINE MALEATE 0.2 MG/ML
200 INJECTION INTRAVENOUS ONCE AS NEEDED
Status: DISCONTINUED | OUTPATIENT
Start: 2018-06-21 | End: 2018-06-21 | Stop reason: HOSPADM

## 2018-06-21 RX ORDER — EPHEDRINE SULFATE 50 MG/ML
5 INJECTION, SOLUTION INTRAVENOUS AS NEEDED
Status: DISCONTINUED | OUTPATIENT
Start: 2018-06-21 | End: 2018-06-21 | Stop reason: HOSPADM

## 2018-06-21 RX ORDER — ONDANSETRON 2 MG/ML
4 INJECTION INTRAMUSCULAR; INTRAVENOUS EVERY 6 HOURS PRN
Status: DISCONTINUED | OUTPATIENT
Start: 2018-06-21 | End: 2018-06-21 | Stop reason: HOSPADM

## 2018-06-21 RX ORDER — FLUTICASONE PROPIONATE 50 MCG
2 SPRAY, SUSPENSION (ML) NASAL DAILY
Status: DISCONTINUED | OUTPATIENT
Start: 2018-06-22 | End: 2018-06-23 | Stop reason: HOSPADM

## 2018-06-21 RX ORDER — LEVOTHYROXINE SODIUM 0.1 MG/1
100 TABLET ORAL EVERY MORNING
Status: DISCONTINUED | OUTPATIENT
Start: 2018-06-22 | End: 2018-06-21

## 2018-06-21 RX ORDER — NALOXONE HCL 0.4 MG/ML
0.1 VIAL (ML) INJECTION
Status: DISCONTINUED | OUTPATIENT
Start: 2018-06-21 | End: 2018-06-23 | Stop reason: HOSPADM

## 2018-06-21 RX ORDER — LEVOTHYROXINE SODIUM 88 UG/1
88 TABLET ORAL
Status: DISCONTINUED | OUTPATIENT
Start: 2018-06-22 | End: 2018-06-23 | Stop reason: HOSPADM

## 2018-06-21 RX ORDER — OXYTOCIN-SODIUM CHLORIDE 0.9% IV SOLN 30 UNIT/500ML 30-0.9/5 UT/ML-%
125 SOLUTION INTRAVENOUS CONTINUOUS PRN
Status: COMPLETED | OUTPATIENT
Start: 2018-06-21 | End: 2018-06-21

## 2018-06-21 RX ORDER — OXYCODONE HYDROCHLORIDE AND ACETAMINOPHEN 5; 325 MG/1; MG/1
1 TABLET ORAL EVERY 4 HOURS PRN
Status: DISCONTINUED | OUTPATIENT
Start: 2018-06-21 | End: 2018-06-23 | Stop reason: HOSPADM

## 2018-06-21 RX ORDER — ONDANSETRON 2 MG/ML
4 INJECTION INTRAMUSCULAR; INTRAVENOUS EVERY 6 HOURS PRN
Status: DISCONTINUED | OUTPATIENT
Start: 2018-06-21 | End: 2018-06-23 | Stop reason: HOSPADM

## 2018-06-21 RX ORDER — ACETAMINOPHEN 325 MG/1
650 TABLET ORAL EVERY 4 HOURS PRN
Status: DISCONTINUED | OUTPATIENT
Start: 2018-06-21 | End: 2018-06-21 | Stop reason: HOSPADM

## 2018-06-21 RX ORDER — MORPHINE SULFATE 2 MG/ML
1 INJECTION, SOLUTION INTRAMUSCULAR; INTRAVENOUS EVERY 4 HOURS PRN
Status: DISCONTINUED | OUTPATIENT
Start: 2018-06-21 | End: 2018-06-23 | Stop reason: HOSPADM

## 2018-06-21 RX ORDER — LIDOCAINE HYDROCHLORIDE 10 MG/ML
5 INJECTION, SOLUTION EPIDURAL; INFILTRATION; INTRACAUDAL; PERINEURAL AS NEEDED
Status: DISCONTINUED | OUTPATIENT
Start: 2018-06-21 | End: 2018-06-21 | Stop reason: HOSPADM

## 2018-06-21 RX ORDER — PANTOPRAZOLE SODIUM 40 MG/1
40 TABLET, DELAYED RELEASE ORAL EVERY MORNING
Status: DISCONTINUED | OUTPATIENT
Start: 2018-06-22 | End: 2018-06-23 | Stop reason: HOSPADM

## 2018-06-21 RX ORDER — ONDANSETRON 4 MG/1
4 TABLET, FILM COATED ORAL EVERY 6 HOURS PRN
Status: DISCONTINUED | OUTPATIENT
Start: 2018-06-21 | End: 2018-06-23 | Stop reason: HOSPADM

## 2018-06-21 RX ORDER — MONTELUKAST SODIUM 10 MG/1
10 TABLET ORAL NIGHTLY
Status: DISCONTINUED | OUTPATIENT
Start: 2018-06-21 | End: 2018-06-23 | Stop reason: HOSPADM

## 2018-06-21 RX ORDER — OXYTOCIN-SODIUM CHLORIDE 0.9% IV SOLN 30 UNIT/500ML 30-0.9/5 UT/ML-%
250 SOLUTION INTRAVENOUS CONTINUOUS
Status: ACTIVE | OUTPATIENT
Start: 2018-06-21 | End: 2018-06-21

## 2018-06-21 RX ORDER — PHYTONADIONE 1 MG/.5ML
INJECTION, EMULSION INTRAMUSCULAR; INTRAVENOUS; SUBCUTANEOUS
Status: DISPENSED
Start: 2018-06-21 | End: 2018-06-22

## 2018-06-21 RX ORDER — OXYTOCIN-SODIUM CHLORIDE 0.9% IV SOLN 30 UNIT/500ML 30-0.9/5 UT/ML-%
999 SOLUTION INTRAVENOUS ONCE
Status: COMPLETED | OUTPATIENT
Start: 2018-06-21 | End: 2018-06-21

## 2018-06-21 RX ORDER — DOCUSATE SODIUM 100 MG/1
100 CAPSULE, LIQUID FILLED ORAL 2 TIMES DAILY PRN
Status: DISCONTINUED | OUTPATIENT
Start: 2018-06-21 | End: 2018-06-23 | Stop reason: HOSPADM

## 2018-06-21 RX ORDER — FAMOTIDINE 10 MG/ML
20 INJECTION, SOLUTION INTRAVENOUS 2 TIMES DAILY PRN
Status: DISCONTINUED | OUTPATIENT
Start: 2018-06-21 | End: 2018-06-21 | Stop reason: HOSPADM

## 2018-06-21 RX ORDER — LANOLIN 100 %
OINTMENT (GRAM) TOPICAL
Status: DISCONTINUED | OUTPATIENT
Start: 2018-06-21 | End: 2018-06-23 | Stop reason: HOSPADM

## 2018-06-21 RX ORDER — ONDANSETRON 4 MG/1
4 TABLET, ORALLY DISINTEGRATING ORAL EVERY 6 HOURS PRN
Status: DISCONTINUED | OUTPATIENT
Start: 2018-06-21 | End: 2018-06-23 | Stop reason: HOSPADM

## 2018-06-21 RX ORDER — TERBUTALINE SULFATE 1 MG/ML
0.25 INJECTION, SOLUTION SUBCUTANEOUS AS NEEDED
Status: DISCONTINUED | OUTPATIENT
Start: 2018-06-21 | End: 2018-06-21 | Stop reason: HOSPADM

## 2018-06-21 RX ORDER — MISOPROSTOL 200 UG/1
800 TABLET ORAL AS NEEDED
Status: DISCONTINUED | OUTPATIENT
Start: 2018-06-21 | End: 2018-06-21 | Stop reason: HOSPADM

## 2018-06-21 RX ADMIN — OXYTOCIN 125 ML/HR: 10 INJECTION, SOLUTION INTRAMUSCULAR; INTRAVENOUS at 21:35

## 2018-06-21 RX ADMIN — SODIUM CHLORIDE, POTASSIUM CHLORIDE, SODIUM LACTATE AND CALCIUM CHLORIDE 125 ML/HR: 600; 310; 30; 20 INJECTION, SOLUTION INTRAVENOUS at 15:56

## 2018-06-21 RX ADMIN — OXYTOCIN 250 ML/HR: 10 INJECTION, SOLUTION INTRAMUSCULAR; INTRAVENOUS at 20:29

## 2018-06-21 RX ADMIN — SODIUM CHLORIDE, POTASSIUM CHLORIDE, SODIUM LACTATE AND CALCIUM CHLORIDE 125 ML/HR: 600; 310; 30; 20 INJECTION, SOLUTION INTRAVENOUS at 12:47

## 2018-06-21 RX ADMIN — LIDOCAINE HYDROCHLORIDE AND EPINEPHRINE 3.5 ML: 15; 5 INJECTION, SOLUTION EPIDURAL at 16:48

## 2018-06-21 RX ADMIN — SODIUM CHLORIDE, POTASSIUM CHLORIDE, SODIUM LACTATE AND CALCIUM CHLORIDE 125 ML/HR: 600; 310; 30; 20 INJECTION, SOLUTION INTRAVENOUS at 18:53

## 2018-06-21 RX ADMIN — OXYTOCIN 999 ML/HR: 10 INJECTION, SOLUTION INTRAMUSCULAR; INTRAVENOUS at 20:12

## 2018-06-21 RX ADMIN — Medication 10 ML/HR: at 16:51

## 2018-06-21 NOTE — PLAN OF CARE
Problem: Anesthesia/Analgesia, Neuraxial (Obstetrics)  Goal: Signs and Symptoms of Listed Potential Problems Will be Absent, Minimized or Managed (Anesthesia/Analgesia, Neuraxial)  Outcome: Ongoing (interventions implemented as appropriate)   06/21/18 2730   Goal/Outcome Evaluation   Problems Assessed (Neuraxial Anesthesia/Analgesia, OB) all   Problems Present (Neuraxial Anesth OB) none

## 2018-06-21 NOTE — PLAN OF CARE
Problem: Patient Care Overview  Goal: Plan of Care Review  Outcome: Ongoing (interventions implemented as appropriate)   06/21/18 1702   Coping/Psychosocial   Plan of Care Reviewed With patient;spouse   Plan of Care Review   Progress improving     Goal: Individualization and Mutuality   06/21/18 1702   Individualization   Patient Specific Preferences pain control during labor   Patient Specific Goals (Include Timeframe) success with breastfeeding   Patient Specific Interventions cag delivery   Mutuality/Individual Preferences   What Anxieties, Fears, Concerns, or Questions Do You Have About Your Care? worried about pain   Mutuality/Individual Preferences   How to Address Anxieties/Fears reassurance

## 2018-06-21 NOTE — H&P
"History and physical    Admission date 2018     Patient: Windy Green MRN: [id number]   YOB: 1980 Age: 38 y.o. Sex: female     Chief Complaint   Patient presents with   • Laboring     Pt seen in the office this am, 5cm in the office       HPI:    Windy Green is a 38 y.o.,  AT 39w2d admitted for Evaluation of possible labor. Denies vaginal bleeding, leakage of fluid. Admits to good fetal movement. Patient was sent over from the office after changing her cervix from 3 cm 5 cm and she has been ambulating. Upon recheck cervix is close to 6 cm and is thinned out to almost 60%.      Past Medical History:   Diagnosis Date   • Abnormal ECG     arrythmia not followed by cardiology   • Acid reflux    • Hashimoto's thyroiditis    • Migraine    • Seasonal allergies      Past Surgical History:   Procedure Laterality Date   • PILONIDAL CYSTECTOMY     • WISDOM TOOTH EXTRACTION       No current facility-administered medications on file prior to encounter.      Current Outpatient Prescriptions on File Prior to Encounter   Medication Sig Dispense Refill   • Ferrous Sulfate (IRON) 325 (65 Fe) MG tablet      • fluticasone (FLONASE) 50 MCG/ACT nasal spray 2 sprays into each nostril Daily.     • lansoprazole (PREVACID) 15 MG capsule Take 15 mg by mouth Daily.     • levothyroxine (SYNTHROID, LEVOTHROID) 100 MCG tablet TAKE 1 TABLET BY MOUTH EVERY DAY 30 tablet 0   • montelukast (SINGULAIR) 10 MG tablet Take 10 mg by mouth Every Night.     • Prenatal Vit-Fe Fumarate-FA (PRENATAL PO) Take  by mouth.         ROS:      Except as outlined in history of physical illness, patient denies any changes in her GYN, , GI systems. All other systems reviewed are negative.      OBJECTIVE:     Vitals:   Vitals:    18 1236   BP: 129/84   BP Location: Right arm   Patient Position: Lying   Pulse: 88   Resp: 18   Temp: 98 °F (36.7 °C)   TempSrc: Oral   SpO2: 99%   Weight: 63.7 kg (140 lb 6 oz)   Height: 165.1 cm (65\") "         Appearance/Psychiatric: In no distress   Constitutional: The patient is well nourished   Cardiovascular: She does not have edema. Heart RRR  Respiratory: Respiratory effort is normal. CTAB   Abdomen: Soft, gravid.  Ext: nontender, no edema. +2/4 bilateral patellar reflexes   Cx; 6 cm 60% -2 vertex, AROM with clear fluid      Patient Active Problem List    Diagnosis   • Pregnancy [Z34.90]   • SI (sacroiliac) joint dysfunction [M53.3]   • GERD (gastroesophageal reflux disease) [K21.9]   • AMA (advanced maternal age) multigravida 35+ [O09.529]   • Hashimoto's thyroiditis [E06.3]       LOS: 0 days    Ramone Avendaño MD   June 21, 2018    Assessment and Plan: Term intrauterine pregnancy  Early labor cervical change from 5-6 cm  Advanced maternal age  Patient requested artificial rupture membranes which was performed, clear fluid is noted, patient is wanting to have an epidural when the contractions  a little more in intensity.

## 2018-06-21 NOTE — PROGRESS NOTES
The patient has felt irregular contractions and pressure.  She notes good fetal movement.  With her last pregnancy she delivered soon after having her membranes stripped and would like her membranes swept today.      Biophysical profile is 8 out of 8.  LENNY is 12.  Estimated fetal weight is 7 lbs. 4 oz. at the 33rd percentile.  Cervix is 5 cm 80% and -2 station     Assessment-39 weeks with advanced maternal age and advanced cervical dilatation.    I recommend the patient go to the hospital due to being 5 cm dilated.  Patient is not feeling contractions now and would like to go home and walk she will go to the hospital when she is feeling more discomfort.  Labor warnings were given.  We discussed that it may be too late to get an epidural if she comes in later.

## 2018-06-21 NOTE — PLAN OF CARE
Problem: Labor (Cervical Ripen, Induct, Augment) (Adult,Obstetrics,Pediatric)  Goal: Signs and Symptoms of Listed Potential Problems Will be Absent, Minimized or Managed (Labor)  Outcome: Ongoing (interventions implemented as appropriate)   06/21/18 0814   Goal/Outcome Evaluation   Problems Assessed (Labor) anaphylactoid syndrome of pregnancy   Problems Present (Labor) none

## 2018-06-21 NOTE — ANESTHESIA PROCEDURE NOTES
Labor Epidural    Patient location during procedure: OB  Start Time: 6/21/2018 4:37 PM  Stop Time: 6/21/2018 4:55 PM  Performed By  Anesthesiologist: PIPER DAVIS  Preanesthetic Checklist  Completed: patient identified, site marked, surgical consent, pre-op evaluation, timeout performed, IV checked, risks and benefits discussed and monitors and equipment checked  Prep:  Pt Position:sitting  Sterile Tech:cap, gloves, gown, mask and sterile barrier  Prep:chlorhexidine gluconate and isopropyl alcohol  Monitoring:blood pressure monitoring, continuous pulse oximetry and EKG  Epidural Block Procedure:  Approach:midline  Guidance:landmark technique and palpation technique  Location:L3-L4  Needle Type:Tuohy  Needle Gauge:17 G  Loss of Resistance Medium: saline  Loss of Resistance: 5cm  Cath Depth at skin:10 cm  Paresthesia: none  Aspiration:negative  Test Dose:negative  Number of Attempts: 1  Post Assessment:  Dressing:occlusive dressing applied and secured with tape  Pt Tolerance:patient tolerated the procedure well with no apparent complications  Complications:no

## 2018-06-21 NOTE — ANESTHESIA PREPROCEDURE EVALUATION
Anesthesia Evaluation     Patient summary reviewed and Nursing notes reviewed                Airway   Mallampati: II  TM distance: >3 FB  Neck ROM: full  Dental      Pulmonary    Cardiovascular         Neuro/Psych  (+) headaches (migraines),     GI/Hepatic/Renal/Endo    (+)  GERD,  hypothyroidism,     Musculoskeletal     Abdominal    Substance History      OB/GYN    (+) Pregnant (AMA),         Other        ROS/Med Hx Other: Solids 6/21 1200  Liquids 6/21 1100                Anesthesia Plan    ASA 2     epidural     Anesthetic plan and risks discussed with patient.

## 2018-06-22 LAB
BASOPHILS # BLD AUTO: 0.01 10*3/MM3 (ref 0–0.2)
BASOPHILS NFR BLD AUTO: 0.1 % (ref 0–1.5)
DEPRECATED RDW RBC AUTO: 46.8 FL (ref 37–54)
EOSINOPHIL # BLD AUTO: 0.01 10*3/MM3 (ref 0–0.7)
EOSINOPHIL NFR BLD AUTO: 0.1 % (ref 0.3–6.2)
ERYTHROCYTE [DISTWIDTH] IN BLOOD BY AUTOMATED COUNT: 13.8 % (ref 11.7–13)
HCT VFR BLD AUTO: 36.1 % (ref 35.6–45.5)
HGB BLD-MCNC: 11.5 G/DL (ref 11.9–15.5)
IMM GRANULOCYTES # BLD: 0.05 10*3/MM3 (ref 0–0.03)
IMM GRANULOCYTES NFR BLD: 0.4 % (ref 0–0.5)
LYMPHOCYTES # BLD AUTO: 2.02 10*3/MM3 (ref 0.9–4.8)
LYMPHOCYTES NFR BLD AUTO: 14.8 % (ref 19.6–45.3)
MCH RBC QN AUTO: 29.8 PG (ref 26.9–32)
MCHC RBC AUTO-ENTMCNC: 31.9 G/DL (ref 32.4–36.3)
MCV RBC AUTO: 93.5 FL (ref 80.5–98.2)
MONOCYTES # BLD AUTO: 1.07 10*3/MM3 (ref 0.2–1.2)
MONOCYTES NFR BLD AUTO: 7.8 % (ref 5–12)
NEUTROPHILS # BLD AUTO: 10.48 10*3/MM3 (ref 1.9–8.1)
NEUTROPHILS NFR BLD AUTO: 76.8 % (ref 42.7–76)
PLATELET # BLD AUTO: 180 10*3/MM3 (ref 140–500)
PMV BLD AUTO: 10.1 FL (ref 6–12)
RBC # BLD AUTO: 3.86 10*6/MM3 (ref 3.9–5.2)
WBC NRBC COR # BLD: 13.64 10*3/MM3 (ref 4.5–10.7)

## 2018-06-22 PROCEDURE — 85025 COMPLETE CBC W/AUTO DIFF WBC: CPT | Performed by: OBSTETRICS & GYNECOLOGY

## 2018-06-22 RX ADMIN — LEVOTHYROXINE SODIUM 88 MCG: 88 TABLET ORAL at 05:47

## 2018-06-22 RX ADMIN — IBUPROFEN 600 MG: 600 TABLET ORAL at 01:00

## 2018-06-22 RX ADMIN — OXYCODONE HYDROCHLORIDE AND ACETAMINOPHEN 1 TABLET: 5; 325 TABLET ORAL at 20:14

## 2018-06-22 RX ADMIN — HYDROCORTISONE 2.5% 1 APPLICATION: 25 CREAM TOPICAL at 09:23

## 2018-06-22 RX ADMIN — MONTELUKAST 10 MG: 10 TABLET, FILM COATED ORAL at 20:08

## 2018-06-22 RX ADMIN — DOCUSATE SODIUM 100 MG: 100 CAPSULE, LIQUID FILLED ORAL at 09:23

## 2018-06-22 RX ADMIN — PANTOPRAZOLE SODIUM 40 MG: 40 TABLET, DELAYED RELEASE ORAL at 20:08

## 2018-06-22 RX ADMIN — Medication: at 09:44

## 2018-06-22 RX ADMIN — DOCUSATE SODIUM 100 MG: 100 CAPSULE, LIQUID FILLED ORAL at 20:08

## 2018-06-22 RX ADMIN — OXYCODONE HYDROCHLORIDE AND ACETAMINOPHEN 1 TABLET: 5; 325 TABLET ORAL at 12:26

## 2018-06-22 RX ADMIN — Medication: at 09:23

## 2018-06-22 RX ADMIN — IBUPROFEN 600 MG: 600 TABLET ORAL at 09:23

## 2018-06-22 RX ADMIN — FLUTICASONE PROPIONATE 2 SPRAY: 50 SPRAY, METERED NASAL at 12:26

## 2018-06-22 RX ADMIN — IBUPROFEN 600 MG: 600 TABLET ORAL at 17:40

## 2018-06-22 NOTE — LACTATION NOTE
"P2. Term infant.  Pt denies difficulties nursing first baby and states this one has been nursing \"like a champ\".  Denies question/concerns at this time.  Has LC # and encouraged to call today for any needs/observation.  "

## 2018-06-22 NOTE — L&D DELIVERY NOTE
[facility]  Vaginal Delivery Note    Delivery     Delivery: Vaginal, Spontaneous Delivery     YOB: 2020    Time of Birth: 8:10 PM      Anesthesia: Epidural     Delivering clinician: Ramone Avendaño    Forceps?   No   Vacuum? No    Shoulder dystocia present: No        Delivery narrative:  Patient was prepped and draped, bladder was drained, and the course of just a couple contractions she spontaneously delivered the vertex. Bulb suction was used for the mouth and oropharynx the remained of the infant was easily delivered. After 30 seconds cord was doubly clamped and ligated. Infant was quite vigorous moving all 4 extremities. Infant was placed in the care of the nurse for evaluation. There were no tears lacerations or episiotomy. Placenta delivered intact with 3 vessels uterus contracted down nicely. Pitocin was given in the IV fluids. Parrish was placed and clear urine was noted    Infant    Findings: female  infant     Infant observations: Weight: No birth weight on file.   Length:    in  Observations/Comments:  scale 3      Apgars: 9   @ 1 minute /    9   @ 5 minutes   Infant Name:      Placenta, Cord, and Fluid    Placenta delivered  Spontaneous  at   6/21  8:13 PM     Cord: 3 vessels  present.   Nuchal Cord?  no   Cord blood obtained: Yes    Cord gases obtained:  No    Cord gas results: Venous:  No results found for: PHCVEN    Arterial:  No results found for: PHCART     Repair    Episiotomy: None    Lacerations: No   Estimated Blood Loss: Est. Blood Loss (mL): 300 mL (Filed from Delivery Summary) (06/21/18 2010)           Complications  none    Disposition  Mother to Mother Baby/Postpartum  in stable condition currently.  Baby to NBN  in stable condition currently.      Ramone Avendaño MD  06/21/18  8:39 PM

## 2018-06-22 NOTE — PROGRESS NOTES
2018    Name:Windy Green   MR#:[id number]    Vaginal Delivery Progress Note    HD#1    Subjective   Postpartum Day 1: 38 y.o. yo Female  delivered at 39w2d  delivered a female  infant.     The patient feels well.  Her pain is controlled.    She ambulating well.  Patient describes her bleeding as thin lochia.    Breastfeeding: without difficulty.     Patient Active Problem List   Diagnosis   • Hashimoto's thyroiditis   • AMA (advanced maternal age) multigravida 35+   • GERD (gastroesophageal reflux disease)   • SI (sacroiliac) joint dysfunction   • Pregnancy   • Normal labor       Objective   Vital Signs Range for the last 24 hours  Temp: Temp:  [95.7 °F (35.4 °C)-98.4 °F (36.9 °C)] 97.9 °F (36.6 °C) Temp src: Oral   BP: BP: ()/(52-84) 105/73        Pulse: Heart Rate:  [55-88] 78  RR: Resp:  [18-20] 20  Weight: 63.7 kg (140 lb 6 oz)  BMI:  Body mass index is 23.36 kg/m².      Lab Results   Component Value Date    WBC 13.64 (H) 2018    HGB 11.5 (L) 2018    HCT 36.1 2018    MCV 93.5 2018     2018       Physical Exam  General:  no acute distresss.  Abdomen: abdomen is soft without significant tenderness, masses, organomegaly or guarding. Fundus: Firm with scant lochia  Extremities: no cyanosis, and 1+ edema, no CT    Perineum:  Intact    Assessment/Plan   1.  PPD# 1      Plan:  Routine Postpartum care      Luiz Castaneda MD  2018 8:37 AM

## 2018-06-22 NOTE — PLAN OF CARE
Problem: Labor (Cervical Ripen, Induct, Augment) (Adult,Obstetrics,Pediatric)  Goal: Signs and Symptoms of Listed Potential Problems Will be Absent, Minimized or Managed (Labor)  Outcome: Outcome(s) achieved Date Met: 18   Goal/Outcome Evaluation   Problems Assessed (Labor) all   Problems Present (Labor) none       Problem: Anesthesia/Analgesia, Neuraxial (Obstetrics)  Goal: Signs and Symptoms of Listed Potential Problems Will be Absent, Minimized or Managed (Anesthesia/Analgesia, Neuraxial)  Outcome: Ongoing (interventions implemented as appropriate)   18   Goal/Outcome Evaluation   Problems Assessed (Neuraxial Anesthesia/Analgesia, OB) all   Problems Present (Neuraxial Anesth OB) none       Problem: Fall Risk,  (Adult,Obstetrics,Pediatric)  Goal: Identify Related Risk Factors and Signs and Symptoms  Outcome: Ongoing (interventions implemented as appropriate)   18   Fall Risk,  (Adult,Obstetrics,Pediatric)   Related Risk Factors (Fall Risk, ) pregnancy weight gain;regional anesthesia;medication side effects   Signs and Symptoms (Fall Risk, ) presence of fall risk factors     Goal: Absence of Maternal Fall  Outcome: Ongoing (interventions implemented as appropriate)   18   Fall Risk,  (Adult,Obstetrics,Pediatric)   Absence of Maternal Fall achieves outcome     Goal: Absence of  Fall/Drop  Outcome: Ongoing (interventions implemented as appropriate)   18   Fall Risk,  (Adult,Obstetrics,Pediatric)   Absence of Butler Fall/Drop achieves outcome       Problem: Skin Injury Risk (Adult)  Goal: Identify Related Risk Factors and Signs and Symptoms  Outcome: Ongoing (interventions implemented as appropriate)   18   Skin Injury Risk (Adult)   Related Risk Factors (Skin Injury Risk) body weight extremes;medication;mobility impaired     Goal: Skin Health and Integrity  Outcome: Ongoing  (interventions implemented as appropriate)   06/21/18 9355   Skin Injury Risk (Adult)   Skin Health and Integrity achieves outcome

## 2018-06-22 NOTE — PLAN OF CARE
Problem: Patient Care Overview  Goal: Plan of Care Review  Outcome: Ongoing (interventions implemented as appropriate)   06/21/18 1827   Coping/Psychosocial   Plan of Care Reviewed With patient;spouse   Plan of Care Review   Progress improving   OTHER   Outcome Summary pt had successful vaginal delivery

## 2018-06-22 NOTE — PLAN OF CARE
Problem: Patient Care Overview  Goal: Plan of Care Review  Outcome: Ongoing (interventions implemented as appropriate)   06/22/18 0359   Coping/Psychosocial   Plan of Care Reviewed With patient   Plan of Care Review   Progress improving   OTHER   Outcome Summary Pain controlled by PO meds, breastfeeding infant well, ambulated and DTV @ 1045. S/L.     Goal: Individualization and Mutuality  Outcome: Ongoing (interventions implemented as appropriate)   06/22/18 0359   Individualization   Patient Specific Preferences Pain control, breastfeeding       Problem: Postpartum (Vaginal Delivery) (Adult,Obstetrics,Pediatric)  Goal: Signs and Symptoms of Listed Potential Problems Will be Absent, Minimized or Managed (Postpartum)  Outcome: Ongoing (interventions implemented as appropriate)   06/22/18 0359   Goal/Outcome Evaluation   Problems Assessed (Postpartum Vaginal Delivery) all   Problems Present (Postpartum Vag Deliv) pain       Problem: Breastfeeding (Adult,Obstetrics,Pediatric)  Goal: Signs and Symptoms of Listed Potential Problems Will be Absent, Minimized or Managed (Breastfeeding)  Outcome: Ongoing (interventions implemented as appropriate)   06/22/18 0359   Goal/Outcome Evaluation   Problems Assessed (Breastfeeding) all   Problems Present (Breastfeeding) none

## 2018-06-22 NOTE — PLAN OF CARE
Problem: Patient Care Overview  Goal: Individualization and Mutuality  Outcome: Ongoing (interventions implemented as appropriate)   06/21/18 1702 06/21/18 2229   Individualization   Patient Specific Preferences --  COLE, breastfeeding   Patient Specific Goals (Include Timeframe) success with breastfeeding --      Goal: Discharge Needs Assessment  Outcome: Ongoing (interventions implemented as appropriate)   06/21/18 2229   Discharge Needs Assessment   Readmission Within the Last 30 Days no previous admission in last 30 days   Concerns to be Addressed no discharge needs identified   Patient/Family Anticipates Transition to home   Patient/Family Anticipated Services at Transition none   Transportation Anticipated car, drives self   Anticipated Changes Related to Illness none   Equipment Needed After Discharge none   Disability   Equipment Currently Used at Home none

## 2018-06-23 VITALS
TEMPERATURE: 98.1 F | RESPIRATION RATE: 18 BRPM | DIASTOLIC BLOOD PRESSURE: 84 MMHG | WEIGHT: 140.38 LBS | SYSTOLIC BLOOD PRESSURE: 127 MMHG | HEIGHT: 65 IN | HEART RATE: 78 BPM | OXYGEN SATURATION: 99 % | BODY MASS INDEX: 23.39 KG/M2

## 2018-06-23 RX ADMIN — FLUTICASONE PROPIONATE 2 SPRAY: 50 SPRAY, METERED NASAL at 09:27

## 2018-06-23 RX ADMIN — IBUPROFEN 600 MG: 600 TABLET ORAL at 04:16

## 2018-06-23 RX ADMIN — LEVOTHYROXINE SODIUM 88 MCG: 88 TABLET ORAL at 06:37

## 2018-06-23 NOTE — PROGRESS NOTES
2018    Name:Windy Green   MR#:[id number]    Vaginal Delivery Progress Note    HD#2    Subjective   Postpartum Day 2: 38 y.o. yo Female  delivered at 39w2d  delivered a female  infant.     The patient feels well.  Her pain is controlled.    She ambulating well.  Patient describes her bleeding as thin lochia.    Breastfeeding: without difficulty.     Patient Active Problem List   Diagnosis   • Hashimoto's thyroiditis   • AMA (advanced maternal age) multigravida 35+   • GERD (gastroesophageal reflux disease)   • SI (sacroiliac) joint dysfunction   • Pregnancy   • Normal labor       Objective   Vital Signs Range for the last 24 hours  Temp: Temp:  [97.6 °F (36.4 °C)-98.1 °F (36.7 °C)] 98.1 °F (36.7 °C) Temp src: Oral   BP: BP: (116-127)/(75-84) 127/84        Pulse: Heart Rate:  [65-78] 78  RR: Resp:  [16-20] 18  Weight: 63.7 kg (140 lb 6 oz)  BMI:  Body mass index is 23.36 kg/m².      Lab Results   Component Value Date    WBC 13.64 (H) 2018    HGB 11.5 (L) 2018    HCT 36.1 2018    MCV 93.5 2018     2018       Physical Exam  General:  no acute distresss.  Abdomen: Fundus: appropriate, firm, non tender Fundus: Firm with scant lochia  Extremities: no cyanosis, and no edema, no CT    Perineum:  Intact    Assessment/Plan   1.  PPD# 2      Plan:  Discharge home       Ronda Wade MD  2018 9:44 AM

## 2018-06-23 NOTE — LACTATION NOTE
This note was copied from a baby's chart.  Breast feeding going well per Mom. Reviewed feeding patterns, output expectancy and encouraged to call for any assistance.

## 2018-06-25 ENCOUNTER — DOCUMENTATION (OUTPATIENT)
Dept: PHYSICAL THERAPY | Facility: HOSPITAL | Age: 38
End: 2018-06-25

## 2018-06-25 NOTE — THERAPY DISCHARGE NOTE
Outpatient Physical Therapy Discharge Summary         Patient Name: Windy Green  : 1980  MRN: [id number]    Today's Date: 2018    Visit Dx:  No diagnosis found.          PT OP Goals     Row Name 18 1700          PT Short Term Goals    STG Date to Achieve 18  -LS     STG 1 Pt will demonstrate understanding and compliance with initial HEP.  -LS     STG 1 Progress Not Met  -LS     STG 1 Progress Comments Pt did not return after evaluation.  -LS     STG 2 Pt will report reduced symptoms by 50% using appropriate body mechanics and stabilization strategies.  -LS     STG 2 Progress Not Met  -LS     STG 2 Progress Comments Pt did not return after evaluation.  -LS        Long Term Goals    LTG Date to Achieve 18  -LS     LTG 1 Pt will demonstrate understanding of advanced HEP to allow her continued management of symptoms.  -LS     LTG 1 Progress Not Met  -LS     LTG 1 Progress Comments Pt did not return after evaluation.  -LS     LTG 2 Pt will report 0/10 pain with ambulation.  -LS     LTG 2 Progress Not Met  -LS     LTG 2 Progress Comments Pt did not return after evaluation.  -LS     LTG 3 Pt will demonstrate STS with appropriate mechanics without SI joint aggravation.  -LS     LTG 3 Progress Not Met  -LS     LTG 3 Progress Comments Pt did not return after evaluation.  -LS       User Key  (r) = Recorded By, (t) = Taken By, (c) = Cosigned By    Initials Name Provider Type    KENYETTA Álvarez, PT Physical Therapist          OP PT Discharge Summary  Date of Discharge: 18  Reason for Discharge: Unable to participate, Non-compliant  Outcomes Achieved: Unable to make functional progress toward goals at this time  Discharge Destination: Home with home program  Discharge Instructions/Additional Comments: Pt did not return due to high copay after initial visit. She has not had baby and has no further PT needs at this time.       Time Calculation:        Therapy Suggested Charges     Code    Minutes Charges    None                       Swathi Álvarez, PT  6/25/2018

## 2018-07-05 NOTE — PROGRESS NOTES
Duration Of Freeze Thaw-Cycle (Seconds): 10 Us done   Consent: The patient's consent was obtained including but not limited to risks of crusting, scabbing, blistering, scarring, darker or lighter pigmentary change, recurrence, incomplete removal and infection. Render Post-Care Instructions In Note?: no Number Of Freeze-Thaw Cycles: 1 freeze-thaw cycle Post-Care Instructions: I reviewed with the patient in detail post-care instructions. Patient is to wear sunprotection, and avoid picking at any of the treated lesions. Pt may apply Vaseline to crusted or scabbing areas. Detail Level: Detailed Medical Necessity Clause: This procedure was medically necessary because the lesions that were treated were: Medical Necessity Information: It is in your best interest to select a reason for this procedure from the list below. All of these items fulfill various CMS LCD requirements except the new and changing color options.

## 2018-07-27 PROBLEM — O09.529 AMA (ADVANCED MATERNAL AGE) MULTIGRAVIDA 35+: Status: RESOLVED | Noted: 2017-12-04 | Resolved: 2018-07-27

## 2018-07-27 PROBLEM — Z37.9 NORMAL LABOR: Status: RESOLVED | Noted: 2018-06-21 | Resolved: 2018-07-27

## 2018-07-27 PROBLEM — Z34.90 PREGNANCY: Status: RESOLVED | Noted: 2018-06-21 | Resolved: 2018-07-27

## 2018-07-30 ENCOUNTER — POSTPARTUM VISIT (OUTPATIENT)
Dept: OBSTETRICS AND GYNECOLOGY | Age: 38
End: 2018-07-30

## 2018-07-30 VITALS
HEIGHT: 65 IN | DIASTOLIC BLOOD PRESSURE: 74 MMHG | SYSTOLIC BLOOD PRESSURE: 108 MMHG | WEIGHT: 125 LBS | BODY MASS INDEX: 20.83 KG/M2

## 2018-07-30 DIAGNOSIS — Z12.4 ROUTINE CERVICAL SMEAR: ICD-10-CM

## 2018-07-30 DIAGNOSIS — E06.3 HASHIMOTO'S THYROIDITIS: Primary | ICD-10-CM

## 2018-07-30 DIAGNOSIS — Z11.51 SPECIAL SCREENING EXAMINATION FOR HUMAN PAPILLOMAVIRUS (HPV): ICD-10-CM

## 2018-07-30 PROCEDURE — 0503F POSTPARTUM CARE VISIT: CPT | Performed by: OBSTETRICS & GYNECOLOGY

## 2018-07-30 RX ORDER — LEVOTHYROXINE SODIUM 88 UG/1
88 TABLET ORAL DAILY
COMMUNITY
End: 2018-07-31 | Stop reason: DRUGHIGH

## 2018-07-30 NOTE — PROGRESS NOTES
"Janusz Green is a 38 y.o. female who presents for a postpartum visit. She is 6 weeks postpartum following a spontaneous vaginal delivery. I have fully reviewed the prenatal and intrapartum course. Postpartum course has been uneventful. Baby is feeding by breast. Bleeding has been normal in amount and decreasing. Bowel function is normal. Bladder function is normal. Patient not sexually active at this time. Contraception method is discussed. Postpartum depression screening: negative.    The following portions of the patient's history were reviewed and updated as appropriate: allergies, current medications,and problem list.    Patient's daughter is doing well.      Review of Systems  Pertinent items are noted in HPI.    Objective   /74   Ht 165.1 cm (65\")   Wt 56.7 kg (125 lb)   LMP 09/19/2017   Breastfeeding? Yes   BMI 20.80 kg/m²    General:  Alert and oriented, NAD    Breasts:         Heart:     Abdomen: Normal findings, nontender    Vulva: Normal, well-healed    Vagina: No lesions or abnormal discharge   Cervix:  Normal with no cervical motion tenderness   Corpus: Normal for post partum visit   Adnexa:  Non tender, non enlarged         Assessment/Plan     Normal postpartum exam. Pap smear done at today's visit.    1. Contraception: condoms  2. Slow return to normal activities reviewed. Continue prenatal vitamins.  3. Follow up in 12 months or sooner as needed.  4.  Recheck TSH today.           "

## 2018-07-31 ENCOUNTER — TELEPHONE (OUTPATIENT)
Dept: OBSTETRICS AND GYNECOLOGY | Age: 38
End: 2018-07-31

## 2018-07-31 LAB — TSH SERPL DL<=0.005 MIU/L-ACNC: 0.16 MIU/ML (ref 0.27–4.2)

## 2018-07-31 RX ORDER — LEVOTHYROXINE SODIUM 0.05 MG/1
50 TABLET ORAL DAILY
Qty: 30 TABLET | Refills: 6 | Status: SHIPPED | OUTPATIENT
Start: 2018-07-31

## 2018-08-01 LAB
CYTOLOGIST CVX/VAG CYTO: NORMAL
CYTOLOGY CVX/VAG DOC THIN PREP: NORMAL
DX ICD CODE: NORMAL
HIV 1 & 2 AB SER-IMP: NORMAL
HPV I/H RISK 4 DNA CVX QL PROBE+SIG AMP: NEGATIVE
OTHER STN SPEC: NORMAL
PATH REPORT.FINAL DX SPEC: NORMAL
STAT OF ADQ CVX/VAG CYTO-IMP: NORMAL

## 2018-08-03 ENCOUNTER — TELEPHONE (OUTPATIENT)
Dept: OBSTETRICS AND GYNECOLOGY | Age: 38
End: 2018-08-03

## 2018-08-03 NOTE — TELEPHONE ENCOUNTER
----- Message from ADAMS Holley sent at 8/1/2018  4:22 PM EDT -----  Let pt know her pap and hpv results are negative

## 2021-04-06 ENCOUNTER — BULK ORDERING (OUTPATIENT)
Dept: CASE MANAGEMENT | Facility: OTHER | Age: 41
End: 2021-04-06

## 2021-04-06 DIAGNOSIS — Z23 IMMUNIZATION DUE: ICD-10-CM
